# Patient Record
Sex: FEMALE | Race: WHITE | URBAN - METROPOLITAN AREA
[De-identification: names, ages, dates, MRNs, and addresses within clinical notes are randomized per-mention and may not be internally consistent; named-entity substitution may affect disease eponyms.]

---

## 2023-03-30 ENCOUNTER — TELEPHONE (OUTPATIENT)
Dept: CARDIOLOGY CLINIC | Age: 43
End: 2023-03-30

## 2023-03-30 ENCOUNTER — OFFICE VISIT (OUTPATIENT)
Dept: CARDIOLOGY CLINIC | Age: 43
End: 2023-03-30
Payer: COMMERCIAL

## 2023-03-30 VITALS
SYSTOLIC BLOOD PRESSURE: 110 MMHG | DIASTOLIC BLOOD PRESSURE: 78 MMHG | WEIGHT: 216 LBS | HEIGHT: 64 IN | BODY MASS INDEX: 36.88 KG/M2 | HEART RATE: 79 BPM

## 2023-03-30 DIAGNOSIS — R07.9 CHEST PAIN, UNSPECIFIED TYPE: Primary | ICD-10-CM

## 2023-03-30 DIAGNOSIS — R06.02 SOB (SHORTNESS OF BREATH): ICD-10-CM

## 2023-03-30 DIAGNOSIS — R00.2 PALPITATION: ICD-10-CM

## 2023-03-30 PROCEDURE — 93246 EXT ECG>7D<15D RECORDING: CPT | Performed by: INTERNAL MEDICINE

## 2023-03-30 PROCEDURE — 93000 ELECTROCARDIOGRAM COMPLETE: CPT | Performed by: INTERNAL MEDICINE

## 2023-03-30 PROCEDURE — 99204 OFFICE O/P NEW MOD 45 MIN: CPT | Performed by: INTERNAL MEDICINE

## 2023-03-30 RX ORDER — CITALOPRAM 20 MG/1
20 TABLET ORAL DAILY
COMMUNITY
Start: 2023-03-25

## 2023-03-30 RX ORDER — PHENTERMINE HYDROCHLORIDE 37.5 MG/1
37.5 TABLET ORAL
COMMUNITY

## 2023-03-30 RX ORDER — NORETHINDRONE ACETATE AND ETHINYL ESTRADIOL AND FERROUS FUMARATE 1MG-20(21)
1 KIT ORAL DAILY
COMMUNITY
Start: 2023-01-30

## 2023-03-30 RX ORDER — ERGOCALCIFEROL 1.25 MG/1
CAPSULE ORAL
COMMUNITY
Start: 2023-03-10

## 2023-03-30 RX ORDER — LEVOTHYROXINE SODIUM 137 UG/1
137 TABLET ORAL DAILY
COMMUNITY
Start: 2023-02-18

## 2023-03-30 NOTE — PROGRESS NOTES
[facility] [place] [id number]     Outpatient Cardiology Consult  Consulting Cardiologist Hannah Desai MD, M.D., [facility] [place]  Referring Provider:  No primary care provider on file. 3/30/2023,9:48 AM    Chief Complaint   Patient presents with    New Patient     Chest Pain           Asked by No admitting provider for patient encounter./No primary care provider on file. to evaluate and assess this patient's chest pains and tachycardia    History of Present Illness: Heber Ocampo is a 38 y.o. female here for evaluation of her heart status related to the chest pains and tachycardia. For the past 6 to 9 months she is having episodes of least 2-3 times per week of feeling chest pain pressure without radiation. Occurs with or without exertion and with or without meals last for about 10 to 15 minutes. There is no associated diaphoresis or nausea. Associated with this is a sense of anxiety and increasing heart rate. Today she looks stable and has no known history of cardiac or heart disease. Examination is unremarkable. She is a bit on the heavy side with a BMI of 37.08.  Lungs are clear extremities do not show edema. Does not smoke and drinks alcohol occasionally. Status postcholecystectomy   Status post thyroidectomy     Family history includes a mother is Aga Wu at age 71 and doing well. Father is age 79 and healthy. 1 brother age 44 and okay. He has 1 pregnancy that was delivered by  15 years ago. Child is now 15 years. Patient's chart has been merged from Angel as surname for her previous  to Miracle with her current . Chest pain short of breath etiology unknown. Hypertension  Hypothyroidism post thyroidectomy  Hyperlipidemia     Past Medical History:   has no past medical history on file. Surgical History:   has no past surgical history on file. Social History:   reports that she has never smoked.  She has never used

## 2023-04-25 ENCOUNTER — HOSPITAL ENCOUNTER (OUTPATIENT)
Dept: NON INVASIVE DIAGNOSTICS | Age: 43
Discharge: HOME | End: 2023-04-25
Payer: COMMERCIAL

## 2023-04-25 DIAGNOSIS — R06.02 SOB (SHORTNESS OF BREATH): ICD-10-CM

## 2023-04-25 DIAGNOSIS — R07.9 CHEST PAIN, UNSPECIFIED TYPE: ICD-10-CM

## 2023-04-25 LAB
LV EF: 70 %
LVEF MODALITY: NORMAL

## 2023-04-25 PROCEDURE — A9502 TC99M TETROFOSMIN: HCPCS | Performed by: INTERNAL MEDICINE

## 2023-04-25 PROCEDURE — 93017 CV STRESS TEST TRACING ONLY: CPT

## 2023-04-25 PROCEDURE — 3430000000 HC RX DIAGNOSTIC RADIOPHARMACEUTICAL: Performed by: INTERNAL MEDICINE

## 2023-04-25 PROCEDURE — 78452 HT MUSCLE IMAGE SPECT MULT: CPT

## 2023-04-25 RX ADMIN — TETROFOSMIN 10 MILLICURIE: 1.38 INJECTION, POWDER, LYOPHILIZED, FOR SOLUTION INTRAVENOUS at 13:11

## 2023-04-25 RX ADMIN — TETROFOSMIN 30 MILLICURIE: 1.38 INJECTION, POWDER, LYOPHILIZED, FOR SOLUTION INTRAVENOUS at 14:26

## 2023-05-08 PROCEDURE — 93248 EXT ECG>7D<15D REV&INTERPJ: CPT | Performed by: INTERNAL MEDICINE

## 2023-05-10 DIAGNOSIS — R00.2 PALPITATIONS: Primary | ICD-10-CM

## 2023-07-19 ENCOUNTER — OFFICE VISIT (OUTPATIENT)
Dept: CARDIOLOGY CLINIC | Age: 43
End: 2023-07-19
Payer: COMMERCIAL

## 2023-07-19 VITALS
BODY MASS INDEX: 36.9 KG/M2 | DIASTOLIC BLOOD PRESSURE: 88 MMHG | HEART RATE: 68 BPM | WEIGHT: 215 LBS | SYSTOLIC BLOOD PRESSURE: 130 MMHG

## 2023-07-19 DIAGNOSIS — R06.02 SOB (SHORTNESS OF BREATH): ICD-10-CM

## 2023-07-19 DIAGNOSIS — E78.5 HYPERLIPIDEMIA LDL GOAL <70: Primary | ICD-10-CM

## 2023-07-19 DIAGNOSIS — R00.2 PALPITATION: ICD-10-CM

## 2023-07-19 PROCEDURE — 99214 OFFICE O/P EST MOD 30 MIN: CPT | Performed by: INTERNAL MEDICINE

## 2023-07-19 NOTE — PROGRESS NOTES
[facility]   Dr Dania Halsted. Cindy Mike MD, [address]     Outpatient Follow Up Note    2023,1:26 PM  Subjective:   CHIEF COMPLAINT / HPI:  Follow Up secondary to chest pain and hypertension     Shayy Kelsey is 41 y.o. female who presents today for a routine follow up. We first saw her back in March and she was able to complete the susceptibility historically she is doing well breathing better. Examination today is stable. Stress test was negative. Blood pressure stable. We Latasha Stager see a concern is her cholesterol 169 for LDL and she has no interest in a statin at this point. She is eating well by diet alone. Chest pain short of breath etiology unknown. Hypertension  Hypothyroidism post thyroidectomy  Hyperlipidemia        Past Medical History:    Past Medical History:   Diagnosis Date    Generalized anxiety disorder     Hyperlipidemia     Hypothyroidism     Irritable bowel syndrome     Major depression      Past Surgical History  Past Surgical History:   Procedure Laterality Date     SECTION      CHOLECYSTECTOMY  2008    THYROIDECTOMY  2004    WISDOM TOOTH EXTRACTION       Social History:       Social History     Tobacco Use   Smoking Status Never   Smokeless Tobacco Never     Current Medications:  Prior to Visit Medications    Medication Sig Taking? Authorizing Provider   levothyroxine (SYNTHROID) 137 MCG tablet Take 1 tablet by mouth daily Take one tablet daily. Yes Historical Provider, MD   vitamin D (ERGOCALCIFEROL) 1.25 MG (71325 UT) CAPS capsule TAKE 1 CAPSULE BY MOUTH 1 TIME A WEEK Yes Historical Provider, MD   citalopram (CELEXA) 20 MG tablet Take 1 tablet by mouth daily Take one tablet daily. Yes Historical Provider, MD MALLORY  1-20 MG-MCG per tablet Take 1 tablet by mouth daily Yes Historical Provider, MD   LORazepam (ATIVAN) 0.5 MG tablet 1 to 2 tablets by mouth every 8 hours as needed for anxiety or insomnia.   Patient taking

## 2024-07-25 ENCOUNTER — OFFICE VISIT (OUTPATIENT)
Dept: CARDIOLOGY CLINIC | Age: 44
End: 2024-07-25
Payer: COMMERCIAL

## 2024-07-25 VITALS
SYSTOLIC BLOOD PRESSURE: 130 MMHG | DIASTOLIC BLOOD PRESSURE: 84 MMHG | HEIGHT: 64 IN | BODY MASS INDEX: 36.19 KG/M2 | HEART RATE: 55 BPM | WEIGHT: 212 LBS

## 2024-07-25 DIAGNOSIS — R07.9 CHEST PAIN, UNSPECIFIED TYPE: Primary | ICD-10-CM

## 2024-07-25 PROCEDURE — 93000 ELECTROCARDIOGRAM COMPLETE: CPT | Performed by: INTERNAL MEDICINE

## 2024-07-25 PROCEDURE — 99215 OFFICE O/P EST HI 40 MIN: CPT | Performed by: INTERNAL MEDICINE

## 2024-07-25 RX ORDER — CITALOPRAM HYDROBROMIDE 40 MG/1
40 TABLET ORAL DAILY
COMMUNITY
Start: 2024-04-18

## 2024-07-25 NOTE — PROGRESS NOTES
[facility] [place]   Dr Rocky Gerard MD, [facility]    Outpatient Follow Up Note    2024,12:30 PM  Subjective:   CHIEF COMPLAINT / HPI:  Follow Up secondary to chest pain and hypertension     Silva Atwood is 44 y.o. female who presents today for a routine follow up.  We first saw her back in March and she was able to complete the susceptibility historically she is doing well breathing better.  Examination today is stable.  Stress test was negative.  Blood pressure stable.  We Moderna see a concern is her cholesterol 169 for LDL and she has no interest in a statin at this point.  She is eating well by diet alone.      Chest pain short of breath etiology unknown.  Hypertension  Hypothyroidism post thyroidectomy  Hyperlipidemia  to 140    Past Medical History:    Past Medical History:   Diagnosis Date    Generalized anxiety disorder     Hyperlipidemia     Hypothyroidism     Irritable bowel syndrome     Major depression      Past Surgical History  Past Surgical History:   Procedure Laterality Date     SECTION      CHOLECYSTECTOMY  2008    THYROIDECTOMY  2004    WISDOM TOOTH EXTRACTION       Social History:       Social History     Tobacco Use   Smoking Status Never   Smokeless Tobacco Never     Current Medications:  Prior to Visit Medications    Medication Sig Taking? Authorizing Provider   citalopram (CELEXA) 40 MG tablet Take 1 tablet by mouth daily Yes Latanya Penaloza MD   levothyroxine (SYNTHROID) 137 MCG tablet Take 1 tablet by mouth daily Take one tablet daily. Yes Latanya Penaloza MD   vitamin D (ERGOCALCIFEROL) 1.25 MG (06582 UT) CAPS capsule TAKE 1 CAPSULE BY MOUTH 1 TIME A WEEK Yes Latanya Penaloza MD   JUNEL FE 1/20 1-20 MG-MCG per tablet Take 1 tablet by mouth daily Yes Latanya Penaloza MD     Family History  Family History   Problem Relation Age of Onset    Other Mother         Hypothyroidism, Hyperlipidemia    Other Father       return to see me in 1 year with lipid profile.  She will likely be on a statin.    Please call if we can assist further [phone].  Rocky Gerard MD, [facility]      This note was likely completed using voice recognition technology and may contain unintended errors